# Patient Record
Sex: MALE | Race: WHITE
[De-identification: names, ages, dates, MRNs, and addresses within clinical notes are randomized per-mention and may not be internally consistent; named-entity substitution may affect disease eponyms.]

---
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
## 2023-09-28 ENCOUNTER — HOSPITAL ENCOUNTER (EMERGENCY)
Age: 18
Discharge: HOME | End: 2023-09-28
Payer: COMMERCIAL

## 2023-09-28 VITALS
DIASTOLIC BLOOD PRESSURE: 60 MMHG | SYSTOLIC BLOOD PRESSURE: 123 MMHG | HEART RATE: 67 BPM | RESPIRATION RATE: 18 BRPM | OXYGEN SATURATION: 99 %

## 2023-09-28 VITALS
HEART RATE: 83 BPM | RESPIRATION RATE: 18 BRPM | TEMPERATURE: 98.42 F | OXYGEN SATURATION: 99 % | DIASTOLIC BLOOD PRESSURE: 68 MMHG | SYSTOLIC BLOOD PRESSURE: 119 MMHG

## 2023-09-28 VITALS — BODY MASS INDEX: 16.9 KG/M2

## 2023-09-28 DIAGNOSIS — J06.9: Primary | ICD-10-CM

## 2023-09-28 DIAGNOSIS — Z20.822: ICD-10-CM

## 2023-09-28 LAB
FLUAV RNA UPPER RESP QL NAA+PROBE: (no result)
FLUBV RNA UPPER RESP QL NAA+PROBE: (no result)

## 2023-09-28 PROCEDURE — 87077 CULTURE AEROBIC IDENTIFY: CPT

## 2023-09-28 PROCEDURE — 0241U: CPT

## 2023-09-28 PROCEDURE — 99282 EMERGENCY DEPT VISIT SF MDM: CPT

## 2023-09-28 PROCEDURE — 87651 STREP A DNA AMP PROBE: CPT

## 2023-09-28 PROCEDURE — 87070 CULTURE OTHR SPECIMN AEROBIC: CPT

## 2024-12-11 ENCOUNTER — HOSPITAL ENCOUNTER (OUTPATIENT)
Age: 19
End: 2024-12-11
Payer: COMMERCIAL

## 2024-12-11 DIAGNOSIS — A28.1: Primary | ICD-10-CM

## 2024-12-11 DIAGNOSIS — M25.50: ICD-10-CM

## 2024-12-11 DIAGNOSIS — M35.3: ICD-10-CM

## 2024-12-11 LAB
ALBUMIN SERPL-MCNC: 4.8 G/DL (ref 3.5–5)
ALBUMIN/GLOB SERPL: 1.8 {RATIO} (ref 1–2.8)
ALP SERPL-CCNC: 57 U/L (ref 38–126)
ALT SERPL-CCNC: 21 IU/L (ref ?–50)
ATYPICAL LYMPHOCYTES PERCENT: 5 %
BAND NEUTROPHILS PERCENT: 2 % (ref 3–7)
BASOPHILS NFR SPEC MANUAL: 2 % (ref 0–1)
BUN SERPL-MCNC: 12 MG/DL (ref 9–20)
CALCIUM SERPL-MCNC: 9.8 MG/DL (ref 8.4–10.2)
CHLORIDE SERPL-SCNC: 102 MMOL/L (ref 98–107)
CK SERPL-CCNC: 190 U/L (ref 55–170)
CO2 SERPL-SCNC: 29 MMOL/L (ref 22–32)
EOSINOPHILS PERCENT MANUAL: 1 % (ref 2–4)
ESTIMATED GLOMERULAR FILT RATE: > 60 ML/MIN (ref 60–?)
GLOBULIN SER CALC-MCNC: 2.7 G/DL (ref 1.7–4.1)
GLUCOSE SERPL-MCNC: 86 MG/DL (ref 70–100)
HEMATOCRIT: 41.4 % (ref 41–53)
HEMOGLOBIN: 13.9 G/DL (ref 13.5–17.5)
HEMOLYSIS: < 15 (ref 0–50)
LYMPHOCYTES PERCENT MANUAL: 14 % (ref 25–45)
MCV RBC: 89.7 FL (ref 80–100)
MEAN CORPUSCULAR HEMOGLOBIN: 30.1 PG (ref 26–34)
MEAN CORPUSCULAR HGB CONC: 33.6 % (ref 30–36)
MONOCYTES PERCENT MANUAL: 2 % (ref 2–11)
NEUTROPHILS ABSOLUTE MANUAL: 5700 /UL (ref 3000–5900)
NEUTS SEG NFR BLD: 74 % (ref 38–70)
PLATELET COUNT: 257 X10^3/UL (ref 150–400)
POTASSIUM SERPL-SCNC: 4.1 MMOL/L (ref 3.4–5.1)
PROT SERPL-MCNC: 7.5 G/DL (ref 6.3–8.2)
SODIUM SERPL-SCNC: 141 MMOL/L (ref 137–145)
TOTAL CELLS COUNTED: 100

## 2024-12-11 PROCEDURE — 86140 C-REACTIVE PROTEIN: CPT

## 2024-12-11 PROCEDURE — 85025 COMPLETE CBC W/AUTO DIFF WBC: CPT

## 2024-12-11 PROCEDURE — 71046 X-RAY EXAM CHEST 2 VIEWS: CPT

## 2024-12-11 PROCEDURE — 82550 ASSAY OF CK (CPK): CPT

## 2024-12-11 PROCEDURE — 85651 RBC SED RATE NONAUTOMATED: CPT

## 2024-12-11 PROCEDURE — 36415 COLL VENOUS BLD VENIPUNCTURE: CPT

## 2024-12-11 PROCEDURE — 80053 COMPREHEN METABOLIC PANEL: CPT

## 2024-12-11 PROCEDURE — 93010 ELECTROCARDIOGRAM REPORT: CPT

## 2024-12-11 PROCEDURE — 86611 BARTONELLA ANTIBODY: CPT

## 2024-12-11 PROCEDURE — 93005 ELECTROCARDIOGRAM TRACING: CPT

## 2024-12-11 NOTE — DI.RAD.S_ITS
PROCEDURE:  XR CHEST 2V 
  
INDICATIONS:  Intermittent chest pain, h/o cat-scratch disease 
  
TECHNIQUE:  2 views of the chest were acquired.   
  
COMPARISON:  [facility], CR, XR CHEST 2V, 6/06/2022, 9:44. 
  
FINDINGS:   
  
Surgical changes and devices:  None.   
  
Lungs and pleura:  Lungs are clear.  No pleural effusions or pneumothorax.   
  
Mediastinum:  Mediastinal contours are normal.  Heart size is normal.   
  
Bones and chest wall:  No suspicious bony abnormalities.  Soft tissues appear  
unremarkable.   
  
  
IMPRESSION:   
  
No acute cardiopulmonary pathology.   
  
Dictated by: Anson Henley M.D. on 12/11/2024 at 18:18      
Approved by: Anson Henley M.D. on 12/11/2024 at 18:19

## 2025-01-07 ENCOUNTER — HOSPITAL ENCOUNTER (EMERGENCY)
Age: 20
Discharge: HOME | End: 2025-01-07
Payer: COMMERCIAL

## 2025-01-07 VITALS
DIASTOLIC BLOOD PRESSURE: 71 MMHG | TEMPERATURE: 98.7 F | RESPIRATION RATE: 18 BRPM | HEART RATE: 60 BPM | OXYGEN SATURATION: 98 % | SYSTOLIC BLOOD PRESSURE: 131 MMHG

## 2025-01-07 VITALS — BODY MASS INDEX: 19.8 KG/M2

## 2025-01-07 VITALS — RESPIRATION RATE: 18 BRPM | HEART RATE: 62 BPM | OXYGEN SATURATION: 99 %

## 2025-01-07 DIAGNOSIS — M54.89: Primary | ICD-10-CM

## 2025-01-07 PROCEDURE — 99283 EMERGENCY DEPT VISIT LOW MDM: CPT

## 2025-01-07 NOTE — ED.BACK
HPI - Back Pain/Injury
General
Chief Complaint: Back Pain/Injury
Stated Complaint: Mid back pain
Time Seen by Provider: 01/07/25 11:23
Source: patient
History of Present Illness
HPI Narrative: 
19-year-old male presents to the ED with 1 day of right-sided mid back pain.  Patient states that the pain started upon awakening, spontaneously.  No known trauma.  Patient states that the pain is in the mid back just lateral to the midline on the 
right side.  Patient states that the pain is worsened with movement of his arms, twisting of the torso, breathing.  The pain is localized, does not radiate.  No chest pain, shortness of breath, fever, chills, nausea, vomiting.  Patient vapes daily, 
uses marijuana.  Occasional alcohol use.
Related Data
Home Medications

 Medication  Instructions  Recorded  Confirmed
cholecalciferol (vitamin D3) 50 50 mcg PO DAILY 12/11/24 12/11/24
mcg (2,000 unit) capsule   
ferrous sulfate 325 mg (65 mg 325 mg PO DAILY 12/11/24 12/11/24
iron) tablet (FeroSul)   
sleep supplement 1 tab PO BEDTIME 12/11/24 12/11/24
vitamin K2 45 mcg capsule 45 mcg PO DAILY 12/11/24 12/11/24
zinc citrate 1 tab PO .qd 12/11/24 12/11/24


Allergies

Allergy/AdvReac Type Severity Reaction Status Date / Time
No Known Drug Allergies Allergy   Verified 12/11/24 14:53



Review of Systems
Constitutional
Constitutional: Denies chills, Denies fatigue, Denies fever(s), Denies frequent falls, Denies lethargy and Denies weakness
Eyes
Eyes: Denies change in vision, Denies eye discharge, Denies irritation and Denies loss of vision
ENT
Ears, Nose, Mouth, and Throat: Denies change in voice, Denies dizziness, Denies neck pain, Denies sore throat and Denies throat swelling
Cardiovascular
Cardiovascular: Denies chest pain, Denies irregular heart rhythm, Denies lightheadedness, Denies palpitations, Denies dyspnea, Denies dyspnea on exertion and Denies orthopnea
Respiratory
Respiratory: Denies cough, Denies dyspnea, Denies dyspnea on exertion and Denies wheezing
Gastrointestinal
Gastrointestinal: Denies abdominal pain, Denies change in bowel habits, Denies diarrhea, Denies nausea and Denies vomiting
Musculoskeletal
Musculoskeletal: Reports back pain, Denies neck pain and Denies numbness
Comments: 
Right-sided mid back pain
Integumentary/Breasts
Skin/Breast: Denies pruritus, Denies erythema, Denies rash and Denies wounds
Neurologic
Neurologic: Denies behavioral changes, Denies confusion, Denies dizziness, Denies frequent falls, Denies loss of vision, Denies numbness and Denies weakness
Psychiatric
Psychiatric: Denies anxiety, Denies behavioral changes, Denies confusion, Denies depression, Denies homicidal ideation and Denies suicidal ideation
Endocrine
Endocrine: Denies fatigue, Denies flushing and Denies palpitations
Hematologic/Lymphatic
Hematologic/Lymphatic: Denies easy bruising
Allergic/Immunologic
Allergic/Immunologic: Denies urticaria, Denies throat swelling and Denies wheezing

Patient History
Medical History (Reviewed 01/07/25 @ 11:42 by Mau Osborne PA-C)

Hematochezia
Hematuria
Joint effusion of knee (Unknown)
Cat-scratch disease in pediatric patient
Healthy adolescent


Surgical History (Reviewed 01/07/25 @ 11:42 by Mau Osborne PA-C)

History of lymph node excision
No pertinent past surgical history


Social History (Reviewed 01/07/25 @ 11:42 by Mau Osborne PA-C)
marital status:  unmarried,living together 
household members:  significant other 
lives independently:  Yes 
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 
sexual history:  monogamous w/fianc? 
Smoking Status:  Current every day smoker 
Tobacco: How many years used:  6 
alcohol intake:  current (3-4 beers weekly) 
substance use type:  marijuana (daily) 


Smoking Status: Current every day smoker
tobacco type: vaping
alcohol intake frequency: holidays/special occasions only

Exam
Narrative
Exam Narrative: 

Const
General:?cooperative, healthy appearing and comfortable
[facility]
Head:?normal to inspection
 Ears:?hearing grossly normal bilaterally
 Nose:?external nose normal
 Face and sinus:?normal facial exam and sinuses nontender
 Mouth:?oral mucosae normal
 Throat:?posterior oropharynx normal
Eyes
General:?appearance normal, both eyes and all related structures
Neck
Neck:?normal visual inspection and no lymphadenopathy noted
Resp
Effort & Inspection:?normal respiratory effort
 Auscultation:?clear to auscultation bilaterally
Cardio
Rate:?regular rate
 Rhythm:?regular rhythm
Musculoskeletal
There is some mild tenderness to palpation to the right mid back, lateral to midline.  No bruising, rashes.  Full range of motion.  Strength and sensation is intact.  Patient is neurovascularly intact.
Neuro
General:?patient alert, patient awake and patient oriented x3
Initial Vital Signs
Initial Vital Signs: 

Vital Signs

Temperature  98.7 F   01/07/25 11:09
Pulse Rate  60   01/07/25 11:09
Respiratory Rate  18   01/07/25 11:09
Blood Pressure  131/71   01/07/25 11:09
Pulse Oximetry  98   01/07/25 11:09
Oxygen Delivery Method  Room Air  01/07/25 11:09




Course
Orders
Ordered: 




Discontinued Medications

Ibuprofen (Ibuprofen 400 Mg Tablet)  800 mg PO NOW ONE
 Stop: 01/07/25 11:40



Vital Signs
Vital signs: 

Vital Signs - 8 hr

 01/07/25
11:09
Temperature 98.7 F
Pulse Rate 60
Respiratory Rate 18
Blood Pressure 131/71
Pulse Oximetry 98
Oxygen Delivery Method Room Air




MDM - Back Pain/Injury
MDM Narrative
Medical decision making narrative: 
19-year-old male presents to the ED with 1 day of right-sided mid back pain.  Physical exam is reassuring in that the pain is reproducible with movement of the arms and twisting of the torso.  There is also tenderness to palpation in the area of 
concern.  Lungs clear to auscultation bilaterally.  This is most consistent with a musculoskeletal etiology.  Unlikely pneumothorax, PE, other cardiopulmonary etiologies.  Patient also states his pain has improved since this morning.  It is now a 
2/10 after some stretching.  Patient was given some ibuprofen, recommend continuing ibuprofen at home until symptoms resolve.  Recommend follow-up with PCP.  ED return precautions were discussed with patient.  Patient verbalized understanding.  

Medical records reviewed: Yes

Discharge Plan
Departure
Patient Disposition: Home

Clinical Impression:
 Mid back pain


Instructions:  DI for Back Strain or Sprain

Activity Restrictions/Additional Instructions:
You were evaluated in the ED today for mid back pain.  It appears that your pain is due to a musculoskeletal sprain/strain.  You were given some ibuprofen in the ED.  You may continue taking ibuprofen at home.  You may take 800 mg of ibuprofen every 
8 hours with food.  Please follow-up with your PCP as soon as possible.  Return to the ED if you have worsening symptoms, chest pain, shortness of breath.

Prescriptions:
No Action
  zinc citrate   
   1 tab PO .qd 
  vitamin K2 45 mcg capsule 
   45 mcg PO DAILY 
  cholecalciferol (vitamin D3) 50 mcg (2,000 unit) capsule 
   50 mcg PO DAILY 
  ferrous sulfate [FeroSul] 325 mg (65 mg iron) tablet 
   325 mg PO DAILY 
  sleep supplement   
   1 tab PO BEDTIME 

Referrals:
Sameer Gutiérrez MD [Primary Care Provider] - 

Stand Alone Forms:  Patient Portal/API/Survey

## 2025-01-07 NOTE — ED_ITS
HPI - Back Pain/Injury    
General    
Chief Complaint: Back Pain/Injury    
Stated Complaint: Mid back pain    
Time Seen by Provider: 01/07/25 11:23    
Source: patient    
History of Present Illness    
HPI Narrative:     
19-year-old male presents to the ED with 1 day of right-sided mid back pain.    
Patient states that the pain started upon awakening, spontaneously.  No known   
trauma.  Patient states that the pain is in the mid back just lateral to the   
midline on the right side.  Patient states that the pain is worsened with   
movement of his arms, twisting of the torso, breathing.  The pain is localized,   
does not radiate.  No chest pain, shortness of breath, fever, chills, nausea,   
vomiting.  Patient vapes daily, uses marijuana.  Occasional alcohol use.    
Related Data    
                                Home Medications    
    
    
    
 Medication  Instructions  Recorded  Confirmed    
     
cholecalciferol (vitamin D3) 50 50 mcg PO DAILY 12/11/24 12/11/24    
    
mcg (2,000 unit) capsule       
     
ferrous sulfate 325 mg (65 mg 325 mg PO DAILY 12/11/24 12/11/24    
    
iron) tablet (FeroSul)       
     
sleep supplement 1 tab PO BEDTIME 12/11/24 12/11/24    
     
vitamin K2 45 mcg capsule 45 mcg PO DAILY 12/11/24 12/11/24    
     
zinc citrate 1 tab PO .qd 12/11/24 12/11/24    
    
    
    
                                    Allergies    
    
    
    
Allergy/AdvReac Type Severity Reaction Status Date / Time    
     
No Known Drug Allergies Allergy   Verified 12/11/24 14:53    
    
    
    
    
Review of Systems    
Constitutional    
Constitutional: Denies chills, Denies fatigue, Denies fever(s), Denies frequent   
falls, Denies lethargy and Denies weakness    
Eyes    
Eyes: Denies change in vision, Denies eye discharge, Denies irritation and   
Denies loss of vision    
ENT    
Ears, Nose, Mouth, and Throat: Denies change in voice, Denies dizziness, Denies   
neck pain, Denies sore throat and Denies throat swelling    
Cardiovascular    
Cardiovascular: Denies chest pain, Denies irregular heart rhythm, Denies   
lightheadedness, Denies palpitations, Denies dyspnea, Denies dyspnea on exertion  
and Denies orthopnea    
Respiratory    
Respiratory: Denies cough, Denies dyspnea, Denies dyspnea on exertion and Denies  
wheezing    
Gastrointestinal    
Gastrointestinal: Denies abdominal pain, Denies change in bowel habits, Denies   
diarrhea, Denies nausea and Denies vomiting    
Musculoskeletal    
Musculoskeletal: Reports back pain, Denies neck pain and Denies numbness    
Comments:     
Right-sided mid back pain    
Integumentary/Breasts    
Skin/Breast: Denies pruritus, Denies erythema, Denies rash and Denies wounds    
Neurologic    
Neurologic: Denies behavioral changes, Denies confusion, Denies dizziness,   
Denies frequent falls, Denies loss of vision, Denies numbness and Denies   
weakness    
Psychiatric    
Psychiatric: Denies anxiety, Denies behavioral changes, Denies confusion, Denies  
depression, Denies homicidal ideation and Denies suicidal ideation    
Endocrine    
Endocrine: Denies fatigue, Denies flushing and Denies palpitations    
Hematologic/Lymphatic    
Hematologic/Lymphatic: Denies easy bruising    
Allergic/Immunologic    
Allergic/Immunologic: Denies urticaria, Denies throat swelling and Denies   
wheezing    
    
Patient History    
Medical History (Reviewed 01/07/25 @ 11:42 by Mau Osborne PA-C)    
    
Hematochezia    
Hematuria    
Joint effusion of knee (Unknown)    
Cat-scratch disease in pediatric patient    
Healthy adolescent    
    
    
Surgical History (Reviewed 01/07/25 @ 11:42 by Mau Osborne PA-C)    
    
History of lymph node excision    
No pertinent past surgical history    
    
    
Social History (Reviewed 01/07/25 @ 11:42 by Mau Osborne PA-C)    
marital status:  unmarried,living together     
household members:  significant other     
lives independently:  Yes     
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     
sexual history:  monogamous w/fianc?     
Smoking Status:  Current every day smoker     
Tobacco: How many years used:  6     
alcohol intake:  current (3-4 beers weekly)     
substance use type:  marijuana (daily)     
    
    
Smoking Status: Current every day smoker    
tobacco type: vaping    
alcohol intake frequency: holidays/special occasions only    
    
Exam    
Narrative    
Exam Narrative:     
    
Const    
General:?cooperative, healthy appearing and comfortable    
[facility]    
Head:?normal to inspection    
 Ears:?hearing grossly normal bilaterally    
 Nose:?external nose normal    
 Face and sinus:?normal facial exam and sinuses nontender    
 Mouth:?oral mucosae normal    
 Throat:?posterior oropharynx normal    
Eyes    
General:?appearance normal, both eyes and all related structures    
Neck    
Neck:?normal visual inspection and no lymphadenopathy noted    
Resp    
Effort & Inspection:?normal respiratory effort    
 Auscultation:?clear to auscultation bilaterally    
Cardio    
Rate:?regular rate    
 Rhythm:?regular rhythm    
Musculoskeletal    
There is some mild tenderness to palpation to the right mid back, lateral to   
midline.  No bruising, rashes.  Full range of motion.  Strength and sensation is  
intact.  Patient is neurovascularly intact.    
Neuro    
General:?patient alert, patient awake and patient oriented x3    
Initial Vital Signs    
Initial Vital Signs:     
    
Vital Signs    
    
    
    
Temperature  98.7 F   01/07/25 11:09    
     
Pulse Rate  60   01/07/25 11:09    
     
Respiratory Rate  18   01/07/25 11:09    
     
Blood Pressure  131/71   01/07/25 11:09    
     
Pulse Oximetry  98   01/07/25 11:09    
     
Oxygen Delivery Method  Room Air  01/07/25 11:09    
    
    
    
    
    
Course    
Orders    
Ordered:     
    
                                            
    
    
Discontinued Medications    
    
Ibuprofen (Ibuprofen 400 Mg Tablet)  800 mg PO NOW ONE    
   Stop: 01/07/25 11:40    
    
    
    
Vital Signs    
Vital signs:     
    
                               Vital Signs - 8 hr    
    
    
    
 01/07/25    
11:09    
     
Temperature 98.7 F    
     
Pulse Rate 60    
     
Respiratory Rate 18    
     
Blood Pressure 131/71    
     
Pulse Oximetry 98    
     
Oxygen Delivery Method Room Air    
    
    
    
    
    
MDM - Back Pain/Injury    
MDM Narrative    
Medical decision making narrative:     
19-year-old male presents to the ED with 1 day of right-sided mid back pain.    
Physical exam is reassuring in that the pain is reproducible with movement of   
the arms and twisting of the torso.  There is also tenderness to palpation in   
the area of concern.  Lungs clear to auscultation bilaterally.  This is most   
consistent with a musculoskeletal etiology.  Unlikely pneumothorax, PE, other   
cardiopulmonary etiologies.  Patient also states his pain has improved since   
this morning.  It is now a 2/10 after some stretching.  Patient was given some   
ibuprofen, recommend continuing ibuprofen at home until symptoms resolve.    
Recommend follow-up with PCP.  ED return precautions were discussed with   
patient.  Patient verbalized understanding.      
    
Medical records reviewed: Yes    
    
Discharge Plan    
Departure    
Patient Disposition: Home    
    
Clinical Impression:    
 Mid back pain    
    
    
Instructions:  DI for Back Strain or Sprain    
    
Activity Restrictions/Additional Instructions:    
You were evaluated in the ED today for mid back pain.  It appears that your pain  
is due to a musculoskeletal sprain/strain.  You were given some ibuprofen in the  
ED.  You may continue taking ibuprofen at home.  You may take 800 mg of   
ibuprofen every 8 hours with food.  Please follow-up with your PCP as soon as   
possible.  Return to the ED if you have worsening symptoms, chest pain,   
shortness of breath.    
    
Prescriptions:    
No Action    
  zinc citrate       
   1 tab PO .qd     
  vitamin K2 45 mcg capsule     
   45 mcg PO DAILY     
  cholecalciferol (vitamin D3) 50 mcg (2,000 unit) capsule     
   50 mcg PO DAILY     
  ferrous sulfate [FeroSul] 325 mg (65 mg iron) tablet     
   325 mg PO DAILY     
  sleep supplement       
   1 tab PO BEDTIME     
    
Referrals:    
Sameer Gutiérrez MD [Primary Care Provider] -     
    
Stand Alone Forms:  Patient Portal/API/Survey

## 2025-06-06 ENCOUNTER — HOSPITAL ENCOUNTER (EMERGENCY)
Age: 20
LOS: 1 days | Discharge: HOME | End: 2025-06-07
Payer: COMMERCIAL

## 2025-06-06 VITALS
DIASTOLIC BLOOD PRESSURE: 60 MMHG | OXYGEN SATURATION: 99 % | SYSTOLIC BLOOD PRESSURE: 116 MMHG | HEART RATE: 55 BPM | RESPIRATION RATE: 16 BRPM

## 2025-06-06 VITALS
OXYGEN SATURATION: 97 % | RESPIRATION RATE: 16 BRPM | TEMPERATURE: 98.42 F | HEART RATE: 64 BPM | SYSTOLIC BLOOD PRESSURE: 112 MMHG | DIASTOLIC BLOOD PRESSURE: 72 MMHG

## 2025-06-06 VITALS — BODY MASS INDEX: 17.9 KG/M2

## 2025-06-06 DIAGNOSIS — R00.1: ICD-10-CM

## 2025-06-06 DIAGNOSIS — R07.9: ICD-10-CM

## 2025-06-06 DIAGNOSIS — R10.13: Primary | ICD-10-CM

## 2025-06-06 DIAGNOSIS — R94.31: ICD-10-CM

## 2025-06-06 LAB
ADD MANUAL DIFF / SLIDE REVIEW: NO
ALBUMIN SERPL-MCNC: 4.8 G/DL (ref 3.5–5)
ALBUMIN/GLOB SERPL: 1.5 {RATIO} (ref 1–2.8)
ALP SERPL-CCNC: 43 U/L (ref 38–126)
ALT SERPL-CCNC: 20 IU/L (ref ?–50)
AST SERPL-CCNC: 29 IU/L (ref 17–59)
BASOPHILS # BLD AUTO: 100 /UL (ref 0–100)
BASOPHILS NFR BLD AUTO: 0.6 % (ref 0–2)
BILIRUB SERPL-MCNC: 0.6 MG/DL (ref 0.2–1.3)
BUN SERPL-MCNC: 15 MG/DL (ref 9–20)
BUN/CREAT SERPL: 16.9 (ref 6–22)
CALCIUM SERPL-MCNC: 9.5 MG/DL (ref 8.4–10.2)
CHLORIDE SERPL-SCNC: 101 MMOL/L (ref 98–107)
CO2 SERPL-SCNC: 30 MMOL/L (ref 22–32)
CREAT SERPL-MCNC: 0.89 MG/DL (ref 0.66–1.25)
EOSINOPHIL # BLD AUTO: 300 /UL (ref 0–450)
EOSINOPHIL NFR BLD AUTO: 3.4 % (ref 2–4)
ESTIMATED GLOMERULAR FILT RATE: > 60 ML/MIN (ref 60–?)
GLOBULIN SER CALC-MCNC: 3.1 G/DL (ref 1.7–4.1)
GLUCOSE SERPL-MCNC: 79 MG/DL (ref 70–99)
HEMATOCRIT: 42.2 % (ref 41–53)
HEMOGLOBIN: 14.3 G/DL (ref 13.5–17.5)
HEMOLYSIS: 16 (ref 0–50)
LIPASE SERPL-CCNC: 56 U/L (ref 23–300)
LYMPHOCYTES # SPEC AUTO: 2200 /UL (ref 1100–4500)
LYMPHOCYTES PERCENT AUTO: 25.5 % (ref 25–40)
MCH RBC QN AUTO: 30.7 PG (ref 26–34)
MCV RBC: 90.7 FL (ref 80–100)
MEAN CORPUSCULAR HGB CONC: 33.9 % (ref 30–36)
MONOCYTES # BLD AUTO: 500 /UL (ref 0–900)
MONOCYTES NFR BLD AUTO: 6 % (ref 3–14)
NEUTROPHILS # BLD AUTO: 5600 /UL (ref 1500–7000)
NEUTROPHILS NFR BLD AUTO: 64.5 % (ref 50–75)
PLATELET COUNT: 226 X10^3/UL (ref 150–400)
POTASSIUM SERPL-SCNC: 4.6 MMOL/L (ref 3.4–5.1)
PROT SERPL-MCNC: 7.9 G/DL (ref 6.3–8.2)
RED BLOOD CELL COUNT: 4.65 X10^6/UL (ref 4.5–5.9)
RED CELL DISTRIBUTION WIDTH: 13.4 % (ref 11.6–14.8)
SODIUM SERPL-SCNC: 141 MMOL/L (ref 137–145)
TROPONIN I SERPL-MCNC: 0.01 NG/ML (ref 0.01–0.03)
WHITE BLOOD CELL COUNT: 8.6 X10^3/UL (ref 4.5–11)

## 2025-06-06 PROCEDURE — 99284 EMERGENCY DEPT VISIT MOD MDM: CPT

## 2025-06-06 PROCEDURE — 80053 COMPREHEN METABOLIC PANEL: CPT

## 2025-06-06 PROCEDURE — 36415 COLL VENOUS BLD VENIPUNCTURE: CPT

## 2025-06-06 PROCEDURE — 71275 CT ANGIOGRAPHY CHEST: CPT

## 2025-06-06 PROCEDURE — 93005 ELECTROCARDIOGRAM TRACING: CPT

## 2025-06-06 PROCEDURE — 84484 ASSAY OF TROPONIN QUANT: CPT

## 2025-06-06 PROCEDURE — 74177 CT ABD & PELVIS W/CONTRAST: CPT

## 2025-06-06 PROCEDURE — 71046 X-RAY EXAM CHEST 2 VIEWS: CPT

## 2025-06-06 PROCEDURE — 85025 COMPLETE CBC W/AUTO DIFF WBC: CPT

## 2025-06-06 PROCEDURE — 83690 ASSAY OF LIPASE: CPT

## 2025-06-07 VITALS
HEART RATE: 67 BPM | RESPIRATION RATE: 18 BRPM | OXYGEN SATURATION: 97 % | SYSTOLIC BLOOD PRESSURE: 114 MMHG | DIASTOLIC BLOOD PRESSURE: 72 MMHG

## 2025-06-11 ENCOUNTER — HOSPITAL ENCOUNTER (OUTPATIENT)
Dept: HOSPITAL 73 - LAB | Age: 20
End: 2025-06-11
Payer: COMMERCIAL

## 2025-06-11 DIAGNOSIS — R39.15: Primary | ICD-10-CM

## 2025-06-11 PROCEDURE — 87086 URINE CULTURE/COLONY COUNT: CPT
